# Patient Record
Sex: FEMALE | URBAN - METROPOLITAN AREA
[De-identification: names, ages, dates, MRNs, and addresses within clinical notes are randomized per-mention and may not be internally consistent; named-entity substitution may affect disease eponyms.]

---
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
## 2022-03-16 ENCOUNTER — APPOINTMENT (OUTPATIENT)
Dept: URBAN - METROPOLITAN AREA CLINIC 272 | Age: 22
Setting detail: DERMATOLOGY
End: 2022-03-20

## 2022-03-16 DIAGNOSIS — L20.89 OTHER ATOPIC DERMATITIS: ICD-10-CM

## 2022-03-16 DIAGNOSIS — L71.0 PERIORAL DERMATITIS: ICD-10-CM

## 2022-03-16 PROCEDURE — OTHER PRESCRIPTION: OTHER

## 2022-03-16 PROCEDURE — 99213 OFFICE O/P EST LOW 20 MIN: CPT

## 2022-03-16 PROCEDURE — OTHER COUNSELING: OTHER

## 2022-03-16 PROCEDURE — OTHER MIPS QUALITY: OTHER

## 2022-03-16 RX ORDER — MOMETASONE FUROATE 1 MG/G
CREAM TOPICAL
Qty: 45 | Refills: 1 | Status: ERX | COMMUNITY
Start: 2022-03-16

## 2022-03-16 RX ORDER — DOXYCYCLINE HYCLATE 100 MG/1
CAPSULE, GELATIN COATED ORAL DAILY
Qty: 30 | Refills: 0 | Status: ERX | COMMUNITY
Start: 2022-03-16

## 2022-03-16 ASSESSMENT — LOCATION DETAILED DESCRIPTION DERM
LOCATION DETAILED: LEFT PROXIMAL POSTERIOR UPPER ARM
LOCATION DETAILED: RIGHT NASOLABIAL FOLD

## 2022-03-16 ASSESSMENT — LOCATION SIMPLE DESCRIPTION DERM
LOCATION SIMPLE: RIGHT LIP
LOCATION SIMPLE: LEFT POSTERIOR UPPER ARM

## 2022-03-16 ASSESSMENT — LOCATION ZONE DERM
LOCATION ZONE: ARM
LOCATION ZONE: LIP

## 2023-04-20 ENCOUNTER — APPOINTMENT (OUTPATIENT)
Dept: URBAN - METROPOLITAN AREA CLINIC 272 | Age: 23
Setting detail: DERMATOLOGY
End: 2023-04-20

## 2023-04-20 DIAGNOSIS — L71.8 OTHER ROSACEA: ICD-10-CM

## 2023-04-20 DIAGNOSIS — L20.89 OTHER ATOPIC DERMATITIS: ICD-10-CM

## 2023-04-20 PROCEDURE — OTHER COUNSELING: OTHER

## 2023-04-20 PROCEDURE — OTHER PRESCRIPTION: OTHER

## 2023-04-20 PROCEDURE — OTHER MIPS QUALITY: OTHER

## 2023-04-20 PROCEDURE — 99214 OFFICE O/P EST MOD 30 MIN: CPT

## 2023-04-20 PROCEDURE — OTHER TREATMENT REGIMEN: OTHER

## 2023-04-20 RX ORDER — METRONIDAZOLE 7.5 MG/G
GEL TOPICAL
Qty: 45 | Refills: 5 | Status: ERX | COMMUNITY
Start: 2023-04-20

## 2023-04-20 RX ORDER — DOXYCYCLINE HYCLATE 100 MG/1
CAPSULE, GELATIN COATED ORAL DAILY
Qty: 30 | Refills: 5 | Status: ERX | COMMUNITY
Start: 2023-04-20

## 2023-04-20 RX ORDER — MOMETASONE FUROATE 1 MG/G
CREAM TOPICAL
Qty: 45 | Refills: 3 | Status: ERX

## 2023-04-20 ASSESSMENT — LOCATION ZONE DERM: LOCATION ZONE: ARM

## 2023-04-20 ASSESSMENT — LOCATION DETAILED DESCRIPTION DERM: LOCATION DETAILED: LEFT PROXIMAL POSTERIOR UPPER ARM

## 2023-04-20 ASSESSMENT — LOCATION SIMPLE DESCRIPTION DERM: LOCATION SIMPLE: LEFT POSTERIOR UPPER ARM

## 2023-04-20 NOTE — PROCEDURE: TREATMENT REGIMEN
Initiate Treatment: Doxycycline 100mg daily then prn flares\\nMetro gel daily
Detail Level: Zone
Initiate Treatment: Mometasone prn flares
Plan: Discussed patch testing with patient

## 2023-05-01 ENCOUNTER — APPOINTMENT (OUTPATIENT)
Dept: URBAN - METROPOLITAN AREA CLINIC 272 | Age: 23
Setting detail: DERMATOLOGY
End: 2023-05-01

## 2023-05-01 DIAGNOSIS — L30.9 DERMATITIS, UNSPECIFIED: ICD-10-CM

## 2023-05-01 PROCEDURE — 95044 PATCH/APPLICATION TESTS: CPT

## 2023-05-01 PROCEDURE — OTHER PATCH TESTING: OTHER

## 2023-05-01 NOTE — PROCEDURE: PATCH TESTING
Post-Care Instructions: I reviewed with the patient in detail post-care instructions. Patient should not sweat, pick at, or get the patches wet for 48 hours.
Consent: Written consent obtained, risks reviewed including but not limited to rash, itching, allergic reaction, systemic rash, remote possiblity of anaphylaxis to allergen.
Detail Level: None
Number Of Patches (Maximum Allowable Per Dos By Cms Is 90): 1

## 2023-05-03 ENCOUNTER — APPOINTMENT (OUTPATIENT)
Dept: URBAN - METROPOLITAN AREA CLINIC 272 | Age: 23
Setting detail: DERMATOLOGY
End: 2023-05-03

## 2023-05-03 DIAGNOSIS — L259 CONTACT DERMATITIS AND OTHER ECZEMA, UNSPECIFIED CAUSE: ICD-10-CM

## 2023-05-03 PROBLEM — L23.9 ALLERGIC CONTACT DERMATITIS, UNSPECIFIED CAUSE: Status: ACTIVE | Noted: 2023-05-03

## 2023-05-03 PROCEDURE — 99213 OFFICE O/P EST LOW 20 MIN: CPT

## 2023-05-03 PROCEDURE — OTHER TREATMENT REGIMEN: OTHER

## 2023-05-03 PROCEDURE — OTHER NORTH AMERICAN 50 READING: OTHER

## 2023-05-03 NOTE — PROCEDURE: [employer] 50 READING
Allergen 58 Reaction: no reaction
Name Of Allergen 4: 4 phenylenediamine base
Name Of Allergen 19: balsam of [place]
Name Of Allergen 21: 2,5 diazolidinilurea
What Reading Time Point?: 48 hour
Allergen 43 Reaction: 1+
Name Of Allergen 20: nickel sulfate hexahydrate
Name Of Allergen 9: neomycin sulfate
Name Of Allergen 6: cinnamic aldehyde
Name Of Allergen 5: imidazolidinyl urea
Name Of Allergen 2: textile dye mix
Allergen 3 Reaction: 3+
Name Of Allergen 3: colophony
Show Negative Results In The Note?: No
Number Of Patches Read: 50
Detail Level: Zone
Name Of Allergen 12: ethylediamine dichloride
Name Of Allergen 13: epoxy resin
Name Of Allergen 10: thiuram mix
Name Of Allergen 11: formaldehyde
Allergen 2 Reaction: 2+
Name Of Allergen 7: amerchol l 101
Name Of Allergen 18: potassium dichromate
Name Of Allergen 17: n,n- diphenylguanide
Name Of Allergen 15: 4 text butylphenolformaldehyde
Name Of Allergen 16: [place] odorata oil
Name Of Allergen 8: carba mix
Name Of Allergen 1: benzocaine
Name Of Allergen 14: hungum 15
Show Allergen Counseling In The Note?: Yes
Name Of Allergen 43: cobalt

## 2023-05-03 NOTE — PROCEDURE: [employer] 50 READING
Allergen 60 Reaction: no reaction
Show Negative Results In The Note?: Yes
Allergen 3 Reaction: 3+
Detail Level: Zone
Number Of Patches Read: 1
Allergen 43 Reaction: 1+
Allergen 2 Reaction: 2+
Show Allergen Counseling In The Note?: No

## 2025-01-13 ENCOUNTER — APPOINTMENT (OUTPATIENT)
Dept: URBAN - METROPOLITAN AREA CLINIC 305 | Age: 25
Setting detail: DERMATOLOGY
End: 2025-01-14

## 2025-01-13 DIAGNOSIS — L71.8 OTHER ROSACEA: ICD-10-CM

## 2025-01-13 DIAGNOSIS — L259 CONTACT DERMATITIS AND OTHER ECZEMA, UNSPECIFIED CAUSE: ICD-10-CM

## 2025-01-13 PROBLEM — L23.9 ALLERGIC CONTACT DERMATITIS, UNSPECIFIED CAUSE: Status: ACTIVE | Noted: 2025-01-13

## 2025-01-13 PROCEDURE — OTHER PRESCRIPTION: OTHER

## 2025-01-13 PROCEDURE — OTHER MIPS QUALITY: OTHER

## 2025-01-13 PROCEDURE — OTHER TREATMENT REGIMEN: OTHER

## 2025-01-13 PROCEDURE — 99214 OFFICE O/P EST MOD 30 MIN: CPT

## 2025-01-13 PROCEDURE — OTHER COUNSELING: OTHER

## 2025-01-13 PROCEDURE — OTHER PRESCRIPTION MEDICATION MANAGEMENT: OTHER

## 2025-01-13 RX ORDER — METRONIDAZOLE 7.5 MG/G
GEL TOPICAL
Qty: 45 | Refills: 11 | Status: ERX | COMMUNITY
Start: 2025-01-13

## 2025-01-13 RX ORDER — DOXYCYCLINE HYCLATE 100 MG/1
CAPSULE, GELATIN COATED ORAL DAILY
Qty: 30 | Refills: 5 | Status: ERX | COMMUNITY
Start: 2025-01-13

## 2025-01-13 RX ORDER — MOMETASONE FUROATE 1 MG/G
CREAM TOPICAL
Qty: 45 | Refills: 6 | Status: ERX | COMMUNITY
Start: 2025-01-13

## 2025-01-13 ASSESSMENT — BSA RASH: BSA RASH: 10

## 2025-01-13 ASSESSMENT — LOCATION ZONE DERM: LOCATION ZONE: FACE

## 2025-01-13 ASSESSMENT — LOCATION SIMPLE DESCRIPTION DERM
LOCATION SIMPLE: RIGHT CHEEK
LOCATION SIMPLE: LEFT CHEEK

## 2025-01-13 ASSESSMENT — LOCATION DETAILED DESCRIPTION DERM
LOCATION DETAILED: LEFT INFERIOR CENTRAL MALAR CHEEK
LOCATION DETAILED: RIGHT CENTRAL MALAR CHEEK

## 2025-01-13 ASSESSMENT — ITCH NUMERIC RATING SCALE: HOW SEVERE IS YOUR ITCHING?: 4

## 2025-01-13 ASSESSMENT — SEVERITY ASSESSMENT: SEVERITY: MILD

## 2025-01-13 NOTE — PROCEDURE: PRESCRIPTION MEDICATION MANAGEMENT
Plan: Patch had patch testing last year and was given results and advised to follow up with an allergist for further work up. Pt did not. Will refer to allergist and patient advised to [facility], to make an appointment with them. Continue Mometasone for now.
Detail Level: Zone
Continue Regimen: Mometasone
Render In Strict Bullet Format?: No